# Patient Record
Sex: MALE | Race: BLACK OR AFRICAN AMERICAN | NOT HISPANIC OR LATINO | URBAN - METROPOLITAN AREA
[De-identification: names, ages, dates, MRNs, and addresses within clinical notes are randomized per-mention and may not be internally consistent; named-entity substitution may affect disease eponyms.]

---

## 2019-04-28 RX ORDER — ALBUTEROL SULFATE 90 UG/1
AEROSOL, METERED RESPIRATORY (INHALATION)
Qty: 18 G | Refills: 0 | OUTPATIENT
Start: 2019-04-28

## 2019-10-20 ENCOUNTER — NURSE TRIAGE (OUTPATIENT)
Dept: ADMINISTRATIVE | Facility: CLINIC | Age: 29
End: 2019-10-20
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
## 2019-10-21 ENCOUNTER — NURSE TRIAGE (OUTPATIENT)
Dept: ADMINISTRATIVE | Facility: CLINIC | Age: 29
End: 2019-10-21

## 2019-10-21 NOTE — TELEPHONE ENCOUNTER
Received message below from [facility]. Tried contacting patient at multiple phone numbers on file to get more info, there was either no answer or number was disconnected. Left message at patient's father number (Beck Bains [phone]) for patient to return clinic call.

## 2019-10-21 NOTE — TELEPHONE ENCOUNTER
Attempted to contact pt and his mom to let them know to keep his appt tomorrow for the treatment as requested.  No answer.

## 2019-10-21 NOTE — TELEPHONE ENCOUNTER
"    Reason for Disposition   Nursing judgment    Protocols used: NO PROTOCOL AVAILABLE - INFORMATION ONLY-A-[place]    Caity's mother, Brianna, called requesting a refill of his albuterol.  She states he is in [place], she is not with him, but says she will go get the albuterol for him and bring it to him.  He does have appt tomorrow morning with Faviola Garcia MD, pcp, but mom states that he is out of the inhaler and needs a refill before the appt.  I did ask her several times if he was having any respiratory distress, but she states he is not; says only that "his asthma is acting up".  Please call in for him to the [facility] in [place], on [phone]  Yudelka Reed at [phone]; pls call her to let her know this has been done, and with any additional questions.    "

## 2019-10-22 NOTE — TELEPHONE ENCOUNTER
Attempted again to speak to pt to let him know that we will see him tomorrow for his appt and to present to the ED if respiratory issues.

## 2023-11-24 PROBLEM — L60.0 INGROWN TOENAIL OF RIGHT FOOT: Status: ACTIVE | Noted: 2023-11-24

## 2023-11-24 PROBLEM — R20.0 BILATERAL LEG NUMBNESS: Status: ACTIVE | Noted: 2023-11-24

## 2023-11-24 PROBLEM — G44.229 CHRONIC TENSION-TYPE HEADACHE, NOT INTRACTABLE: Status: ACTIVE | Noted: 2023-11-24

## 2023-11-24 PROBLEM — S09.90XA HEAD INJURY: Status: ACTIVE | Noted: 2023-11-24

## 2023-11-24 PROBLEM — J45.20 MILD INTERMITTENT ASTHMA WITHOUT COMPLICATION: Status: ACTIVE | Noted: 2023-11-24

## 2023-11-24 PROBLEM — M79.604 ACUTE LEG PAIN, RIGHT: Status: ACTIVE | Noted: 2023-11-24

## 2023-11-24 PROBLEM — R39.11 URINARY HESITANCY: Status: ACTIVE | Noted: 2023-11-24

## 2023-11-24 PROBLEM — B35.1 ONYCHOMYCOSIS: Status: ACTIVE | Noted: 2023-11-24

## 2024-01-12 ENCOUNTER — OFFICE VISIT (OUTPATIENT)
Dept: URGENT CARE | Facility: CLINIC | Age: 34
End: 2024-01-12
Payer: MEDICAID

## 2024-01-12 VITALS
OXYGEN SATURATION: 96 % | SYSTOLIC BLOOD PRESSURE: 113 MMHG | HEIGHT: 66 IN | BODY MASS INDEX: 25.71 KG/M2 | DIASTOLIC BLOOD PRESSURE: 69 MMHG | HEART RATE: 71 BPM | RESPIRATION RATE: 18 BRPM | TEMPERATURE: 98 F | WEIGHT: 160 LBS

## 2024-01-12 DIAGNOSIS — M54.89 MIDLINE BACK PAIN, UNSPECIFIED BACK LOCATION, UNSPECIFIED CHRONICITY: Primary | ICD-10-CM
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
## 2024-01-12 PROCEDURE — 99204 OFFICE O/P NEW MOD 45 MIN: CPT | Mod: S$GLB,,, | Performed by: NURSE PRACTITIONER

## 2024-01-12 RX ORDER — CYCLOBENZAPRINE HCL 10 MG
10 TABLET ORAL 3 TIMES DAILY PRN
Qty: 12 TABLET | Refills: 0 | Status: SHIPPED | OUTPATIENT
Start: 2024-01-12 | End: 2024-01-15

## 2024-01-12 RX ORDER — NAPROXEN 500 MG/1
500 TABLET ORAL 2 TIMES DAILY WITH MEALS
Qty: 12 TABLET | Refills: 0 | Status: SHIPPED | OUTPATIENT
Start: 2024-01-12 | End: 2024-01-15

## 2024-01-12 NOTE — LETTER
January 12, 2024      [facility]  [address], [address]  [place] [address]  Phone: [phone]  Fax: [phone]       Patient: Caity Marcum   YOB: 1990  Date of Visit: 01/12/2024    To Whom It May Concern:    Urmila Marcum  was at [facility] on 01/12/2024. The patient may return to work/school on 1/15/2024 with no restrictions. If you have any questions or concerns, or if I can be of further assistance, please do not hesitate to contact me.    Sincerely,    Reshma Stark, NP
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
